# Patient Record
Sex: MALE | Race: WHITE | Employment: FULL TIME | URBAN - METROPOLITAN AREA
[De-identification: names, ages, dates, MRNs, and addresses within clinical notes are randomized per-mention and may not be internally consistent; named-entity substitution may affect disease eponyms.]

---

## 2022-04-18 ENCOUNTER — HOSPITAL ENCOUNTER (EMERGENCY)
Age: 57
Discharge: HOME | End: 2022-04-18
Attending: EMERGENCY MEDICINE
Payer: COMMERCIAL

## 2022-04-18 ENCOUNTER — APPOINTMENT (OUTPATIENT)
Dept: GENERAL RADIOLOGY | Age: 57
End: 2022-04-18
Attending: EMERGENCY MEDICINE
Payer: COMMERCIAL

## 2022-04-18 VITALS
BODY MASS INDEX: 30.47 KG/M2 | HEART RATE: 69 BPM | RESPIRATION RATE: 16 BRPM | OXYGEN SATURATION: 97 % | WEIGHT: 218.48 LBS | SYSTOLIC BLOOD PRESSURE: 120 MMHG | DIASTOLIC BLOOD PRESSURE: 78 MMHG | TEMPERATURE: 97.8 F

## 2022-04-18 DIAGNOSIS — S92.355A NONDISPLACED FRACTURE OF FIFTH METATARSAL BONE, LEFT FOOT, INITIAL ENCOUNTER FOR CLOSED FRACTURE: Primary | ICD-10-CM

## 2022-04-18 DIAGNOSIS — S93.402A SPRAIN OF LEFT ANKLE, UNSPECIFIED LIGAMENT, INITIAL ENCOUNTER: ICD-10-CM

## 2022-04-18 DIAGNOSIS — S92.252A CLOSED AVULSION FRACTURE OF NAVICULAR BONE OF LEFT FOOT, INITIAL ENCOUNTER: ICD-10-CM

## 2022-04-18 PROCEDURE — 73610 X-RAY EXAM OF ANKLE: CPT

## 2022-04-18 PROCEDURE — 99283 EMERGENCY DEPT VISIT LOW MDM: CPT

## 2022-04-18 PROCEDURE — 73630 X-RAY EXAM OF FOOT: CPT

## 2022-04-18 RX ORDER — NAPROXEN 500 MG/1
500 TABLET ORAL
Qty: 20 TABLET | Refills: 0 | Status: SHIPPED | OUTPATIENT
Start: 2022-04-18 | End: 2022-04-28

## 2022-04-18 NOTE — ED TRIAGE NOTES
Pt arrives with L ankle pain after twisting it while walking his dog yesterday. Some bruising noted.

## 2022-04-18 NOTE — ED NOTES
Pt ambulatory out of ED with discharge instructions and prescriptions in hand given by Dr. Vipin Shaw; pt verbalized understanding of discharge paperwork and time allotted for questions. VSS. Pt alert and oriented.

## 2022-04-18 NOTE — ED PROVIDER NOTES
18-year-old male presents to the emergency department stating that yesterday while playing with his dog the dog ran into his leg causing him to twist his left ankle against the floor and knocked him over. He has been ambulating on his foot without significant difficulty just trying to take some weight off of his ankle with a slight limp. He denies any history of prior ankle fractures or surgeries. He has noted some developing bruising over the lateral aspect of his foot and lateral malleolus. He took a dose of ibuprofen with some improvement in his symptoms. He rates his pain as 3/10 in severity. He denies any other injuries or medical concerns. Past Medical History:   Diagnosis Date    Chronic kidney disease     Polycystic kidney    Gastrointestinal disorder     Diverticulitis    Hypertension        History reviewed. No pertinent surgical history. History reviewed. No pertinent family history. Social History     Socioeconomic History    Marital status:      Spouse name: Not on file    Number of children: Not on file    Years of education: Not on file    Highest education level: Not on file   Occupational History    Not on file   Tobacco Use    Smoking status: Never Smoker    Smokeless tobacco: Not on file   Substance and Sexual Activity    Alcohol use: Yes     Comment: Occasional    Drug use: Yes     Types: Marijuana    Sexual activity: Not on file   Other Topics Concern    Not on file   Social History Narrative    Not on file     Social Determinants of Health     Financial Resource Strain:     Difficulty of Paying Living Expenses: Not on file   Food Insecurity:     Worried About Running Out of Food in the Last Year: Not on file    Cinda of Food in the Last Year: Not on file   Transportation Needs:     Lack of Transportation (Medical): Not on file    Lack of Transportation (Non-Medical):  Not on file   Physical Activity:     Days of Exercise per Week: Not on file  Minutes of Exercise per Session: Not on file   Stress:     Feeling of Stress : Not on file   Social Connections:     Frequency of Communication with Friends and Family: Not on file    Frequency of Social Gatherings with Friends and Family: Not on file    Attends Advent Services: Not on file    Active Member of Clubs or Organizations: Not on file    Attends Club or Organization Meetings: Not on file    Marital Status: Not on file   Intimate Partner Violence:     Fear of Current or Ex-Partner: Not on file    Emotionally Abused: Not on file    Physically Abused: Not on file    Sexually Abused: Not on file   Housing Stability:     Unable to Pay for Housing in the Last Year: Not on file    Number of [place] in the Last Year: Not on file    Unstable Housing in the Last Year: Not on file         ALLERGIES: Other food    Review of Systems   Constitutional: Negative for activity change, appetite change, chills and fever. HENT: Negative for congestion, rhinorrhea, sinus pain, sneezing and sore throat. Eyes: Negative for photophobia and visual disturbance. Respiratory: Negative for cough and shortness of breath. Cardiovascular: Negative for chest pain. Gastrointestinal: Negative for abdominal pain, blood in stool, constipation, diarrhea, nausea and vomiting. Genitourinary: Negative for difficulty urinating, dysuria, flank pain, hematuria, penile pain and testicular pain. Musculoskeletal: Positive for arthralgias (Left ankle), gait problem and joint swelling. Negative for back pain, myalgias and neck pain. Skin: Negative for rash and wound. Neurological: Negative for syncope, weakness, light-headedness, numbness and headaches. Psychiatric/Behavioral: Negative for self-injury and suicidal ideas. All other systems reviewed and are negative.       Vitals:    04/18/22 0927   BP: 136/71   Pulse: 69   Resp: 16   Temp: 97.8 °F (36.6 °C)   SpO2: (!) 10%   Weight: 99.1 kg (218 lb 7.6 oz) Physical Exam  Vitals and nursing note reviewed. Constitutional:       General: He is not in acute distress. Appearance: Normal appearance. He is well-developed. He is not diaphoretic. HENT:      Head: Normocephalic and atraumatic. Nose: Nose normal.   Eyes:      Extraocular Movements: Extraocular movements intact. Conjunctiva/sclera: Conjunctivae normal.      Pupils: Pupils are equal, round, and reactive to light. Cardiovascular:      Rate and Rhythm: Normal rate and regular rhythm. Heart sounds: Normal heart sounds. Pulmonary:      Effort: Pulmonary effort is normal.      Breath sounds: Normal breath sounds. Abdominal:      General: There is no distension. Palpations: Abdomen is soft. Tenderness: There is no abdominal tenderness. Musculoskeletal:         General: Swelling, tenderness and signs of injury present. No deformity. Cervical back: Neck supple. Left knee: Normal.      Left ankle: Swelling and ecchymosis present. No deformity or lacerations. Tenderness present over the lateral malleolus. No proximal fibula tenderness. Decreased range of motion. Normal pulse. Left Achilles Tendon: Normal.        Legs:    Skin:     General: Skin is warm and dry. Neurological:      General: No focal deficit present. Mental Status: He is alert and oriented to person, place, and time. Cranial Nerves: No cranial nerve deficit. Sensory: No sensory deficit. Motor: No weakness. Coordination: Coordination normal.          MDM   74-year-old male presents with inversion ankle sprain type injury with ecchymosis to ankle and foot. Afebrile with vital signs stable in acute distress. X-rays were reviewed by myself and read by radiology showing a acute nondisplaced fracture of the fifth metatarsal base as well as a tiny avulsion fracture from the dorsal navicular bone.   He was given postop shoe and recommended naprosyn or other OTC pain relievers, elevation, and orthopedics follow-up for further evaluation. Return precautions were given for worsening or concerns. Please note that this dictation was completed with Adventor[employer], the computer voice recognition software. Quite often unanticipated grammatical, syntax, homophones, and other interpretive errors are inadvertently transcribed by the computer software. Please disregard these errors. Please excuse any errors that have escaped final proofreading.       Procedures

## 2022-04-20 ENCOUNTER — OFFICE VISIT (OUTPATIENT)
Dept: ORTHOPEDIC SURGERY | Age: 57
End: 2022-04-20
Payer: COMMERCIAL

## 2022-04-20 DIAGNOSIS — M79.672 ACUTE PAIN OF LEFT FOOT: ICD-10-CM

## 2022-04-20 DIAGNOSIS — S92.252A CLOSED AVULSION FRACTURE OF NAVICULAR BONE OF LEFT FOOT, INITIAL ENCOUNTER: ICD-10-CM

## 2022-04-20 DIAGNOSIS — S92.355A CLOSED NONDISPLACED FRACTURE OF FIFTH METATARSAL BONE OF LEFT FOOT, INITIAL ENCOUNTER: Primary | ICD-10-CM

## 2022-04-20 PROCEDURE — L4387 NON-PNEUM WALK BOOT PRE OTS: HCPCS | Performed by: ORTHOPAEDIC SURGERY

## 2022-04-20 PROCEDURE — 99202 OFFICE O/P NEW SF 15 MIN: CPT | Performed by: ORTHOPAEDIC SURGERY

## 2022-04-20 PROCEDURE — 28470 CLTX METATARSAL FX WO MNP EA: CPT | Performed by: ORTHOPAEDIC SURGERY

## 2022-04-20 NOTE — PROGRESS NOTES
Cooper Man (: 1965) is a 61 y.o. male, patient,here for evaluation of the following   Chief Complaint   Patient presents with    Foot Injury     left foot 5th metatarsal fx after his dog crashed into him on 2022        ASSESSMENT/PLAN:  Below is the assessment and plan developed based on review of pertinent history, physical exam, labs, studies, and medications. 1. Closed nondisplaced fracture of fifth metatarsal bone of left foot, initial encounter  -     [place] [address]  2. Closed avulsion fracture of navicular bone of left foot, initial encounter  3. Acute pain of left foot  -     REFERRAL TO [facility]  -     MS NON-PNEUM WALK BOOT PRE OTS    The patient informed of findings and shown the x-rays. Fortunately surgery is not indicated for these injuries and he should do well with conservative treatment. Immobilization in a boot recommended and limited weightbearing for 2 weeks prior to starting weightbearing as tolerated. He does have some other underlying medical conditions of chronic kidney disease that may result in delayed healing. Patient informed of this, he is fitted with a boot today, next follow-up we will get new x-rays of the left foot 3 views nonweightbearing. Return in about 4 weeks (around 2022) for repeat xrays. Allergies   Allergen Reactions    Other Food Anaphylaxis     Beta blocker       Current Outpatient Medications   Medication Sig    naproxen (Naprosyn) 500 mg tablet Take 1 Tablet by mouth two (2) times daily as needed for Pain for up to 10 days.  diltiazem (TIAZAC) 360 mg ER capsule Take 360 mg by mouth daily.  hydrochlorothiazide (HYDRODIURIL) 25 mg tablet Take 25 mg by mouth daily.  lisinopril (PRINIVIL, ZESTRIL) 40 mg tablet Take 40 mg by mouth daily. No current facility-administered medications for this visit.        Past Medical History:   Diagnosis Date    Chronic kidney disease     Polycystic kidney    Gastrointestinal disorder     Diverticulitis    Hypertension        No past surgical history on file. No family history on file. Social History     Socioeconomic History    Marital status:      Spouse name: Not on file    Number of children: Not on file    Years of education: Not on file    Highest education level: Not on file   Occupational History    Not on file   Tobacco Use    Smoking status: Never Smoker    Smokeless tobacco: Never Used   Substance and Sexual Activity    Alcohol use: Yes     Comment: Occasional    Drug use: Yes     Types: Marijuana    Sexual activity: Not on file   Other Topics Concern    Not on file   Social History Narrative    Not on file     Social Determinants of Health     Financial Resource Strain:     Difficulty of Paying Living Expenses: Not on file   Food Insecurity:     Worried About Running Out of Food in the Last Year: Not on file    Cinda of Food in the Last Year: Not on file   Transportation Needs:     Lack of Transportation (Medical): Not on file    Lack of Transportation (Non-Medical):  Not on file   Physical Activity:     Days of Exercise per Week: Not on file    Minutes of Exercise per Session: Not on file   Stress:     Feeling of Stress : Not on file   Social Connections:     Frequency of Communication with Friends and Family: Not on file    Frequency of Social Gatherings with Friends and Family: Not on file    Attends Restorationism Services: Not on file    Active Member of [address] [place] [address] [place] or Organizations: Not on file    Attends Club or Organization Meetings: Not on file    Marital Status: Not on file   Intimate Partner Violence:     Fear of Current or Ex-Partner: Not on file    Emotionally Abused: Not on file    Physically Abused: Not on file    Sexually Abused: Not on file   Housing Stability:     Unable to Pay for Housing in the Last Year: Not on file    Number of [place] in the Last Year: Not on file    Unstable Housing in the Last Year: Not on file           Vitals: There were no vitals taken for this visit. There is no height or weight on file to calculate BMI. SUBJECTIVE/OBJECTIVE:  Natali Lopez (: 1965)   New patient presents for while playing with his dog, the dog ran into his leg causing him to twist his left ankle against the floor and knocked him over. He has been ambulating on his foot without significant difficulty just trying to take some weight off of his ankle with a slight limp. He denies any history of prior ankle fractures or surgeries. He has noted some developing bruising over the lateral aspect of his foot and lateral malleolus. He took a dose of ibuprofen with some improvement in his symptoms. He rates his pain as 3/10 in severity. He denies any other injuries or medical concerns.         Physical Exam  Pleasant well-nourished male , alert and oriented to person, time and place, no acute distress. Antalgic gait, limited weightbearing stance. Left lower extremity/ankle: Calf soft nontender, range of motion intact although somewhat limited due to pain at the foot. Tendon surrounding ankle intact, negative Carey test, negative ankle squeeze test    Left foot: Tenderness at the fifth metatarsal, tenderness around the dorsal navicular hindfoot joints, no gross instabilities. Resolving ecchymosis, mild swelling. No open wounds or lesions. Contralateral foot and ankle exam, nontender, no swelling ligaments grossly stable. Normal weightbearing stance. Neurovascular exam intact for light touch sensation, cap refill, dorsalis pedis pulse palpable, flexion/extension strength 5/5. Skin intact without open wounds, lesions or ulcers, no skin discolorations, normal warmth to skin. Imaging:  XR Results (maximum last 2):   Results from [place] encounter on 22    XR ANKLE LT MIN 3 V    Narrative  EXAM: XR FOOT LT MIN 3 V, XR ANKLE LT MIN 3 V    INDICATION: twisted foot and ankle.    COMPARISON: None. FINDINGS: Three views of the left foot. 2 views of the left ankle. The ankle  mortise is intact. The talar dome is intact. There is a thin fracture fragment  avulsed from the dorsal aspect of the navicular bone. This is of elevated by  approximately 2 mm. There is a nondisplaced horizontal fracture of the fifth  metatarsal base. This is in the region of a Flor fracture. There is a mild  hallux valgus deformity. Bony bunion formation is seen from the medial first  metatarsal head. Impression  1. Cortical avulsion fracture from the dorsal navicular bone. 2. Acute nondisplaced fracture of the fifth metatarsal base. XR FOOT LT MIN 3 V    Narrative  EXAM: XR FOOT LT MIN 3 V, XR ANKLE LT MIN 3 V    INDICATION: twisted foot and ankle. COMPARISON: None. FINDINGS: Three views of the left foot. 2 views of the left ankle. The ankle  mortise is intact. The talar dome is intact. There is a thin fracture fragment  avulsed from the dorsal aspect of the navicular bone. This is of elevated by  approximately 2 mm. There is a nondisplaced horizontal fracture of the fifth  metatarsal base. This is in the region of a Flor fracture. There is a mild  hallux valgus deformity. Bony bunion formation is seen from the medial first  metatarsal head. Impression  1. Cortical avulsion fracture from the dorsal navicular bone. 2. Acute nondisplaced fracture of the fifth metatarsal base. The x-rays obtained at the emergency room on April 18, 2022 at the left ankle and foot AP, lateral and oblique nonweightbearing x-rays. Both set of x-rays were reviewed which confirms a nondisplaced fifth metatarsal fracture at the left foot, an avulsion type injury to the dorsal navicular bone which is also notable on both views  An electronic signature was used to authenticate this note.   -- Melissa Chanel MD

## 2022-06-24 ENCOUNTER — HOSPITAL ENCOUNTER (EMERGENCY)
Age: 57
Discharge: HOME | End: 2022-06-25
Attending: STUDENT IN AN ORGANIZED HEALTH CARE EDUCATION/TRAINING PROGRAM
Payer: COMMERCIAL

## 2022-06-24 DIAGNOSIS — R55 SYNCOPE AND COLLAPSE: Primary | ICD-10-CM

## 2022-06-24 PROCEDURE — 96360 HYDRATION IV INFUSION INIT: CPT

## 2022-06-24 PROCEDURE — 96361 HYDRATE IV INFUSION ADD-ON: CPT

## 2022-06-24 PROCEDURE — 99284 EMERGENCY DEPT VISIT MOD MDM: CPT

## 2022-06-25 VITALS
WEIGHT: 215 LBS | RESPIRATION RATE: 20 BRPM | BODY MASS INDEX: 30.78 KG/M2 | TEMPERATURE: 98 F | HEART RATE: 65 BPM | HEIGHT: 70 IN | SYSTOLIC BLOOD PRESSURE: 109 MMHG | DIASTOLIC BLOOD PRESSURE: 64 MMHG | OXYGEN SATURATION: 92 %

## 2022-06-25 LAB
ALBUMIN SERPL-MCNC: 3.5 G/DL (ref 3.5–5)
ALBUMIN/GLOB SERPL: 1.2 {RATIO} (ref 1.1–2.2)
ALP SERPL-CCNC: 34 U/L (ref 45–117)
ALT SERPL-CCNC: 42 U/L (ref 12–78)
ANION GAP SERPL CALC-SCNC: 8 MMOL/L (ref 5–15)
AST SERPL-CCNC: 24 U/L (ref 15–37)
BASOPHILS # BLD: 0.1 K/UL (ref 0–0.1)
BASOPHILS NFR BLD: 1 % (ref 0–1)
BILIRUB SERPL-MCNC: 0.4 MG/DL (ref 0.2–1)
BUN SERPL-MCNC: 29 MG/DL (ref 6–20)
BUN/CREAT SERPL: 15 (ref 12–20)
CALCIUM SERPL-MCNC: 8.5 MG/DL (ref 8.5–10.1)
CHLORIDE SERPL-SCNC: 104 MMOL/L (ref 97–108)
CO2 SERPL-SCNC: 30 MMOL/L (ref 21–32)
CREAT SERPL-MCNC: 1.89 MG/DL (ref 0.7–1.3)
DIFFERENTIAL METHOD BLD: ABNORMAL
EOSINOPHIL # BLD: 0.8 K/UL (ref 0–0.4)
EOSINOPHIL NFR BLD: 4 % (ref 0–7)
ERYTHROCYTE [DISTWIDTH] IN BLOOD BY AUTOMATED COUNT: 13.4 % (ref 11.5–14.5)
ETHANOL SERPL-MCNC: <10 MG/DL
GLOBULIN SER CALC-MCNC: 2.9 G/DL (ref 2–4)
GLUCOSE SERPL-MCNC: 113 MG/DL (ref 65–100)
HCT VFR BLD AUTO: 40.4 % (ref 36.6–50.3)
HGB BLD-MCNC: 13.8 G/DL (ref 12.1–17)
IMM GRANULOCYTES # BLD AUTO: 0.2 K/UL (ref 0–0.04)
IMM GRANULOCYTES NFR BLD AUTO: 1 % (ref 0–0.5)
LIPASE SERPL-CCNC: 227 U/L (ref 73–393)
LYMPHOCYTES # BLD: 3.8 K/UL (ref 0.8–3.5)
LYMPHOCYTES NFR BLD: 21 % (ref 12–49)
MCH RBC QN AUTO: 31.7 PG (ref 26–34)
MCHC RBC AUTO-ENTMCNC: 34.2 G/DL (ref 30–36.5)
MCV RBC AUTO: 92.7 FL (ref 80–99)
MONOCYTES # BLD: 1.8 K/UL (ref 0–1)
MONOCYTES NFR BLD: 10 % (ref 5–13)
NEUTS SEG # BLD: 11.9 K/UL (ref 1.8–8)
NEUTS SEG NFR BLD: 63 % (ref 32–75)
NRBC # BLD: 0 K/UL (ref 0–0.01)
NRBC BLD-RTO: 0 PER 100 WBC
PLATELET # BLD AUTO: 242 K/UL (ref 150–400)
PMV BLD AUTO: 10 FL (ref 8.9–12.9)
POTASSIUM SERPL-SCNC: 3.1 MMOL/L (ref 3.5–5.1)
PROT SERPL-MCNC: 6.4 G/DL (ref 6.4–8.2)
RBC # BLD AUTO: 4.36 M/UL (ref 4.1–5.7)
SODIUM SERPL-SCNC: 142 MMOL/L (ref 136–145)
TROPONIN-HIGH SENSITIVITY: 10 NG/L (ref 0–76)
WBC # BLD AUTO: 18.5 K/UL (ref 4.1–11.1)

## 2022-06-25 PROCEDURE — 74011250636 HC RX REV CODE- 250/636: Performed by: STUDENT IN AN ORGANIZED HEALTH CARE EDUCATION/TRAINING PROGRAM

## 2022-06-25 PROCEDURE — 82077 ASSAY SPEC XCP UR&BREATH IA: CPT

## 2022-06-25 PROCEDURE — 80053 COMPREHEN METABOLIC PANEL: CPT

## 2022-06-25 PROCEDURE — 36415 COLL VENOUS BLD VENIPUNCTURE: CPT

## 2022-06-25 PROCEDURE — 84484 ASSAY OF TROPONIN QUANT: CPT

## 2022-06-25 PROCEDURE — 96360 HYDRATION IV INFUSION INIT: CPT

## 2022-06-25 PROCEDURE — 96361 HYDRATE IV INFUSION ADD-ON: CPT

## 2022-06-25 PROCEDURE — 83690 ASSAY OF LIPASE: CPT

## 2022-06-25 PROCEDURE — 85025 COMPLETE CBC W/AUTO DIFF WBC: CPT

## 2022-06-25 RX ADMIN — SODIUM CHLORIDE 1000 ML: 9 INJECTION, SOLUTION INTRAVENOUS at 02:02

## 2022-06-25 RX ADMIN — SODIUM CHLORIDE 1000 ML: 9 INJECTION, SOLUTION INTRAVENOUS at 00:41

## 2022-06-25 NOTE — ED PROVIDER NOTES
Patient is a 48-year-old male present emergency department after having a syncopal event at home. Patient states that he had gone out for a few beers this evening states he had 2-3 beers came home and ate [place] food felt like he had over eaten and then was smoking marijuana through a bong afterwards he told his girlfriend he was not feeling well lay down in the recliner and per the girlfriend he went unresponsive states that she fell like he was not breathing and was making odd sounds that sounded like a gurgling sound. Patient denies any prodrome of headaches, dizziness, lightheadedness, chest pain or shortness of breath. Patient denies any nausea or vomiting. Patient denies any recent illnesses. Patient states that he is otherwise healthy has a history of polycystic kidney disease that has been stable. Past Medical History:   Diagnosis Date    Chronic kidney disease     Polycystic kidney    Gastrointestinal disorder     Diverticulitis    Hypertension        No past surgical history on file. No family history on file. Social History     Socioeconomic History    Marital status:      Spouse name: Not on file    Number of children: Not on file    Years of education: Not on file    Highest education level: Not on file   Occupational History    Not on file   Tobacco Use    Smoking status: Never Smoker    Smokeless tobacco: Never Used   Substance and Sexual Activity    Alcohol use: Yes     Comment: Occasional    Drug use: Yes     Types: Marijuana    Sexual activity: Not on file   Other Topics Concern    Not on file   Social History Narrative    Not on file     Social Determinants of Health     Financial Resource Strain:     Difficulty of Paying Living Expenses: Not on file   Food Insecurity:     Worried About Running Out of Food in the Last Year: Not on file    Cinda of Food in the Last Year: Not on file   Transportation Needs:     Lack of Transportation (Medical):  Not on file    Lack of Transportation (Non-Medical): Not on file   Physical Activity:     Days of Exercise per Week: Not on file    Minutes of Exercise per Session: Not on file   Stress:     Feeling of Stress : Not on file   Social Connections:     Frequency of Communication with Friends and Family: Not on file    Frequency of Social Gatherings with Friends and Family: Not on file    Attends Caodaism Services: Not on file    Active Member of [address] [place] [address] or Organizations: Not on file    Attends Club or Organization Meetings: Not on file    Marital Status: Not on file   Intimate Partner Violence:     Fear of Current or Ex-Partner: Not on file    Emotionally Abused: Not on file    Physically Abused: Not on file    Sexually Abused: Not on file   Housing Stability:     Unable to Pay for Housing in the Last Year: Not on file    Number of [place] in the Last Year: Not on file    Unstable Housing in the Last Year: Not on file         ALLERGIES: Other food    Review of Systems   Neurological: Positive for syncope. All other systems reviewed and are negative. Vitals:    06/25/22 0130 06/25/22 0200 06/25/22 0230 06/25/22 0258   BP: 102/65 100/61 92/68 109/64   Pulse: 65 60  65   Resp: 20 17 16 20   Temp:    98 °F (36.7 °C)   SpO2: 93% 92% 91% 92%   Weight:       Height:                Physical Exam  Vitals and nursing note reviewed. Constitutional:       Appearance: Normal appearance. HENT:      Head: Normocephalic and atraumatic. Eyes:      Extraocular Movements: Extraocular movements intact. Pupils: Pupils are equal, round, and reactive to light. Cardiovascular:      Rate and Rhythm: Normal rate and regular rhythm. Pulses: Normal pulses. Heart sounds: Normal heart sounds. Pulmonary:      Effort: Pulmonary effort is normal.      Breath sounds: Normal breath sounds. Abdominal:      General: Abdomen is flat. Palpations: Abdomen is soft.    Musculoskeletal:         General: Normal range of motion. Cervical back: Normal range of motion and neck supple. Skin:     General: Skin is warm and dry. Neurological:      General: No focal deficit present. Mental Status: He is alert and oriented to person, place, and time. Psychiatric:         Mood and Affect: Mood normal.         Behavior: Behavior normal.          MDM  Number of Diagnoses or Management Options  Syncope and collapse  Diagnosis management comments: Patient is a 58-year-old male present emergency department for likely vasovagal syncope. Patient is otherwise healthy EKG shows a normal sinus rhythm with a rate of 66 no ST or T wave abnormalities to suggest ischemia or infarct. Will obtain labs including cardiac markers fluids for hydration. Patient's creatinine slightly elevated likely secondary to dehydration. Patient received a second liter bolus patient to be discharged.          Procedures

## 2022-06-25 NOTE — ROUTINE PROCESS
Emergency Room Nursing Note        Patient Name: Levon Bucio      : 1965             MRN: [id number]      Chief Complaint: Loss of Consciousness      Admit Diagnosis: No admission diagnoses are documented for this encounter. Surgery: * No surgery found *            MD reviewed discharge instructions and options with patient. Patient verbalized understanding. RN reviewed discharge instructions using teach back method. Patient ambulatory to exit without difficulty and no acute signs of distress. No complaints or needs expressed at this time. Patient counseled on medications prescribed at discharge (If prescribed). Vital signs stable. Patient to follow up with PCP/Specialist on the next business day for appointment. All questions answered by ER RN.           Lines:        Vitals: Patient Vitals for the past 12 hrs:   Temp Pulse Resp BP SpO2   22 0258 98 °F (36.7 °C) 65 20 109/64 92 %   22 0230   16 92/68 91 %   22 0200  60 17 100/61 92 %   22 0130  65 20 102/65 93 %   22 0100  65 15 98/65 92 %   22 0030  71 25 94/64 93 %   22 0000    98/69 92 %   22 2359 98 °F (36.7 °C) 68 18 103/64 94 %         Signed by: Niko Borja RN, JAYCE, BSN, [facility]                                              2022 at 3:26 AM

## 2022-06-25 NOTE — ED TRIAGE NOTES
Emergency Room Nursing Note        Patient Name: Michael Subramanian      : 1965             MRN: [id number]      Chief Complaint:  Loss of Consciousness      Admit Diagnosis: No admission diagnoses are documented for this encounter. Admitting Provider: No admitting provider for patient encounter. Surgery: * No surgery found *           Patient arrived via EMS from home ambulatory with complaints of passing out while watching TV. Per patient he consumed some beers & smoked some marijuana and recently had Bronchitis.          Lines:        Signed by: Tyler Frankel, RN, JAYCE, BSN, [facility]                                              2022 at 12:01 AM

## 2022-06-25 NOTE — ED NOTES
Discharge note: The patient was discharged home in stable condition, accompanied by family member. The patient is alert and oriented, is in no respiratory distress and has vital signs within normal limits. The patient's diagnosis, condition and treatment were explained to patient by Dr Myrna Gordon. The patient expressed understanding of discharge instructions and plan of care. A discharge plan has been developed. A  was not involved in the process. Patient offered a wheelchair to ED lobby for discharge but declined at this time. Patient ambulatory with a steady gate to ED lobby to go home with family member.

## 2022-07-06 ENCOUNTER — TRANSCRIBE ORDER (OUTPATIENT)
Dept: SCHEDULING | Age: 57
End: 2022-07-06

## 2022-07-06 DIAGNOSIS — R55 SYNCOPE: Primary | ICD-10-CM

## 2022-07-06 DIAGNOSIS — R56.9 SEIZURE (HCC): ICD-10-CM

## 2022-07-15 ENCOUNTER — HOSPITAL ENCOUNTER (OUTPATIENT)
Dept: CT IMAGING | Age: 57
Discharge: HOME | End: 2022-07-15
Attending: FAMILY MEDICINE
Payer: COMMERCIAL

## 2022-07-15 DIAGNOSIS — R55 SYNCOPE: ICD-10-CM

## 2022-07-15 DIAGNOSIS — R56.9 SEIZURE (HCC): ICD-10-CM

## 2022-07-15 PROCEDURE — 70450 CT HEAD/BRAIN W/O DYE: CPT

## 2022-09-09 ENCOUNTER — OFFICE VISIT (OUTPATIENT)
Dept: NEUROLOGY | Age: 57
End: 2022-09-09
Payer: COMMERCIAL

## 2022-09-09 VITALS
OXYGEN SATURATION: 100 % | DIASTOLIC BLOOD PRESSURE: 74 MMHG | HEART RATE: 76 BPM | BODY MASS INDEX: 31.5 KG/M2 | SYSTOLIC BLOOD PRESSURE: 124 MMHG | HEIGHT: 70 IN | RESPIRATION RATE: 16 BRPM | WEIGHT: 220 LBS

## 2022-09-09 DIAGNOSIS — R55 SYNCOPE, UNSPECIFIED SYNCOPE TYPE: Primary | ICD-10-CM

## 2022-09-09 PROCEDURE — 99204 OFFICE O/P NEW MOD 45 MIN: CPT | Performed by: PSYCHIATRY & NEUROLOGY

## 2022-09-09 NOTE — LETTER
9/9/2022    Patient: Jalyn Crane   YOB: 1965   Date of Visit: 9/9/2022     Domonique Baez MD  [facility] [address]  Via Fax: [phone]    Dear Domonique Baez MD,      Thank you for referring Mr. Jalyn Crane to [facility] for evaluation. My notes for this consultation are attached. If you have questions, please do not hesitate to call me. I look forward to following your patient along with you.       Sincerely,    Ata Blackman MD

## 2022-09-09 NOTE — PROGRESS NOTES
Chief Complaint   Patient presents with    New Patient     Patient states he came home after at hard day at work, tried had a 2 beers prior to getting home,  he states he did smoke some marijuana, his girlfriend told him he was pale and unable to wake him up, and he had an episode of incontinence she called 911, transported to ER, B/P low that night and the hospital doctor stated it was due to the marijuana and low B/P. Patient states he has had no other episodes.

## 2022-09-09 NOTE — PROGRESS NOTES
Nicolette Todd (1965) is a 61 y.o. male, new patient, here for evaluation of the following     Chief complaint(s):   Chief Complaint   Patient presents with    New Patient     Patient states he came home after at hard day at work, tried had a 2 beers prior to getting home,  he states he did smoke some marijuana, his girlfriend told him he was pale and unable to wake him up, and he had an episode of incontinence she called 911, transported to ER, B/P low that night and the hospital doctor stated it was due to the marijuana and low B/P. Patient states he has had no other episodes. HPI: 61 y.o. male who  has a past medical history of Chronic kidney disease, Gastrointestinal disorder, and Hypertension. Self Referred for evaluation of seizure vs syncopal episode    Reviewed ER Physician note 6-. Pt presented to ER after having a syncopal episode at home. He told ER Physician that he had gone out, had a few beers, came home, ate [place] food, felt like he had overeaten, smokes marijuana, told Girlfriend he wasn't feeling well, laid down in a recliner, then girlfriend reported him as being unresponsive, not breathing, making odd gurgling sounds. Pt had no sense that something was going to happen. Vitals in ER showed low blood pressure (102/ 65, 100/ 61, 92/ 68, 109/ 64). His Neurologic exam in the ER was non-focal.  ER Physician felt patient most likely had vasovagal episode (EKG was normal). Cr was slightly elevated. After IV fluids, he was discharged home. At follow up with PCP, CT Head w/o contrast was ordered for the above described episode. I reviewed that report 7-15-22: FINDINGS: Mild generalized volume loss. There is no significant white matter disease. There is no intracranial hemorrhage, extra-axial collection, or mass effect. The basilar cisterns are open. No CT evidence of acute infarct. The bone windows demonstrate no abnormalities.  Bilateral maxillary sinus mucosal thickening, right more than left. Patchy bilateral ethmoid air cell mucosal thickening. Bilateral sphenoid sinus mucous retention cysts. Mastoid air cells are grossly clear. IMPRESSION: No acute abnormality. Pt says girlfriend did not report observing any shaking / convulsion  He did urinate on himself  Did not bite tongue  No personal or FHx of seizure  No episodes since the one on 6-  Pt says his BP usually runs a little high, no hx of it being low  Denies any palpitations, chest pain, or chest pressure    Says he was resting/ reclined in a chair when he became unresponsive  When he became alert, he says didn't really feel excessively fatigued afterward      Review of Systems: HTN, Kidney disease, Syncope     ==================================================    Allergies   Allergen Reactions    Other Food Anaphylaxis     Beta blocker    Cat Dander Cough and Sneezing    Tree And Shrub Pollen Sneezing       Current Outpatient Medications   Medication Sig Dispense Refill    diltiazem (TIAZAC) 360 mg ER capsule Take 360 mg by mouth daily. lisinopril (PRINIVIL, ZESTRIL) 40 mg tablet Take 40 mg by mouth daily. hydrochlorothiazide (HYDRODIURIL) 25 mg tablet Take 25 mg by mouth daily. Past Medical History:   Diagnosis Date    Chronic kidney disease     Polycystic kidney    Gastrointestinal disorder     Diverticulitis    Hypertension        No past surgical history on file. family history is not on file. reports that he has never smoked. He has never used smokeless tobacco. He reports current alcohol use. He reports current drug use. Drug: Marijuana. PHYSICAL EXAM    Vitals:    09/09/22 1007   BP: 124/74   Pulse: 76   Resp: 16   Height: 5' 10\" (1.778 m)   Weight: 99.8 kg (220 lb)   SpO2: 100%     Awake alert resting calm conversant. EOMI. Visual fields normal bilateral.  Hearing and speech is normal.  Face appears symmetric facial sensation is intact on both sides.   Tongue is midline, soft palate elevates symmetrically her shoulder shrug is symmetric.     5 out of 5 strength in all extremities. Intact light touch in all extremities. No resting postural or intention tremors. 1+ biceps, 2+ patellar reflexes (symmetric). Gait is normal.  Mild difficulty with tandem gait. Romberg exam is negative.    ==================================================    ASSESSMENT/ PLAN:       ICD-10-CM ICD-9-CM    1. Syncope, unspecified syncope type  R55 780.2 EEG SLEEP DEPRIVED         Episode of unresponsiveness with urinary incontinence; no associated convulsive activity  Pt reported feeling unwell before the episode happened  In ER, BPs were found to be low, which pt says isn't normal for him  CT Head is unremarkable    Sounds more likely that he had Orthostatic syncope than an seizure. Will still check sleep deprived EEG to ensure no underlying epileptiform discharges. Suggested to patient that he follow up with the Cardiologist he saw a few years ago to be sure there is on cardiac cause of his episode of unresponsiveness in June 2022. Pt will call my office after EEG is completed and we will give results over the phone. If there is any concerning finding on the EEG, a follow up visit will be arranged. An electronic signature was used to authenticate this note.   -- Ondina Wilcox MD

## 2022-09-26 ENCOUNTER — OFFICE VISIT (OUTPATIENT)
Dept: NEUROLOGY | Age: 57
End: 2022-09-26
Payer: COMMERCIAL

## 2022-09-26 DIAGNOSIS — R55 SYNCOPE, UNSPECIFIED SYNCOPE TYPE: Primary | ICD-10-CM

## 2022-09-26 PROCEDURE — 95819 EEG AWAKE AND ASLEEP: CPT | Performed by: PSYCHIATRY & NEUROLOGY

## 2022-09-30 NOTE — PROCEDURES
Name:   Bolivar Hart  Age/ Sex:   61 y.o. male     Procedure:   EEG     Date of Recordin2022    Date of Interpretation:    22    Interpreting Physician: Clyde Tavarez MD     Indication:      ICD-10-CM ICD-9-CM    1. Syncope, unspecified syncope type  R55 780.2           Current medications: has a current medication list which includes the following prescription(s): diltiazem er, hydrochlorothiazide, and lisinopril. Technical: Digital EEG recorded in 10-20 international placement system, multiple montages    Interpretation:   The patient is described as awake, alert but sleep deprived at the beginning of this recording. The background is symmetric and medium in amplitude. The posterior dominant rhythm is 9 Hz on both sides. Photic stimulation results in a mild symmetric driving response. Hyperventilation was not performed. Drowsiness is noted as a background rhythm waxes and wanes in frequency into the theta range. Stage II sleep was recorded and was normal.  There were no focal areas of slowing, epileptiform discharge or subclinical seizures. The single-lead EKG was unremarkable. Impression:   Normal awake drowsy and brief sleep EEG recording. Clinical correlation is necessary.

## 2024-06-18 ENCOUNTER — HOSPITAL ENCOUNTER (OUTPATIENT)
Facility: HOSPITAL | Age: 59
Discharge: HOME | End: 2024-06-21
Attending: INTERNAL MEDICINE
Payer: COMMERCIAL

## 2024-06-18 DIAGNOSIS — N28.1 CYST OF KIDNEY, ACQUIRED: ICD-10-CM

## 2024-06-18 PROCEDURE — 76770 US EXAM ABDO BACK WALL COMP: CPT
